# Patient Record
Sex: FEMALE | Race: WHITE
[De-identification: names, ages, dates, MRNs, and addresses within clinical notes are randomized per-mention and may not be internally consistent; named-entity substitution may affect disease eponyms.]

---

## 2020-06-12 ENCOUNTER — HOSPITAL ENCOUNTER (EMERGENCY)
Dept: HOSPITAL 46 - ED | Age: 40
LOS: 1 days | Discharge: HOME | End: 2020-06-13
Payer: COMMERCIAL

## 2020-06-12 VITALS — HEIGHT: 63 IN | WEIGHT: 139 LBS | BODY MASS INDEX: 24.63 KG/M2

## 2020-06-12 DIAGNOSIS — R19.7: Primary | ICD-10-CM

## 2020-06-12 DIAGNOSIS — F17.200: ICD-10-CM

## 2020-06-12 DIAGNOSIS — F32.9: ICD-10-CM

## 2020-06-12 DIAGNOSIS — Z79.899: ICD-10-CM

## 2022-04-07 ENCOUNTER — HOSPITAL ENCOUNTER (EMERGENCY)
Dept: HOSPITAL 46 - ED | Age: 42
LOS: 1 days | Discharge: HOME | End: 2022-04-08
Payer: COMMERCIAL

## 2022-04-07 VITALS — WEIGHT: 137.35 LBS | HEIGHT: 63 IN | BODY MASS INDEX: 24.34 KG/M2

## 2022-04-07 DIAGNOSIS — F32.9: ICD-10-CM

## 2022-04-07 DIAGNOSIS — Z79.891: ICD-10-CM

## 2022-04-07 DIAGNOSIS — N64.4: Primary | ICD-10-CM

## 2022-04-07 DIAGNOSIS — Z79.899: ICD-10-CM

## 2022-04-07 PROCEDURE — A9270 NON-COVERED ITEM OR SERVICE: HCPCS

## 2022-04-07 NOTE — XMS
PreManage Notification: JULIANA LEHMAN MRN:[id number]
 
Security Information
 
Security Events
No recent Security Events currently on file
 
 
 
CRITERIA MET
------------
- ED - Positive COVID-19 Lab Result - [facility]
 
 
CARE PROVIDERS
-------------------------------------------------------------------------------------
WINNIE LUNA     Nurse Practitioner: Family     Current
 
PHONE: Unknown
-------------------------------------------------------------------------------------
Marilyn Aguilar     Nurse Practitioner: Family     Current
 
PHONE: [phone]
-------------------------------------------------------------------------------------
[facility]        04/17/2018-[facility] ([facility]
 
PHONE: [phone]
-------------------------------------------------------------------------------------
Guru Montalvo              /Care Coordinator     Current
[facility] TEAM
 
PHONE: Unknown
-------------------------------------------------------------------------------------
 
Valerie has no Care Guidelines for this patient.
 
BOBBY VISIT COUNT (12 MO.)
-------------------------------------------------------------------------------------
1 MAULIK Akins
-------------------------------------------------------------------------------------
TOTAL 1
-------------------------------------------------------------------------------------
NOTE: Visits indicate total known visits.
 
ED/UCC VISIT TRACKING (12 MO.)
-------------------------------------------------------------------------------------
04/07/2022 23:37
[facility] [place]
 
TYPE: Emergency
 
COMPLAINT:
- LEFT BREAST AND ARMPIT PAIN
-------------------------------------------------------------------------------------
 
 
INPATIENT VISIT TRACKING (12 MO.)
 
No inpatient visits to display in this time frame
 
[email or web address]

## 2022-05-03 ENCOUNTER — HOSPITAL ENCOUNTER (EMERGENCY)
Dept: HOSPITAL 46 - ED | Age: 42
LOS: 1 days | Discharge: HOME | End: 2022-05-04
Payer: COMMERCIAL

## 2022-05-03 VITALS — WEIGHT: 137 LBS | BODY MASS INDEX: 24.27 KG/M2 | HEIGHT: 63 IN

## 2022-05-03 DIAGNOSIS — F32.A: ICD-10-CM

## 2022-05-03 DIAGNOSIS — F17.200: ICD-10-CM

## 2022-05-03 DIAGNOSIS — Z85.3: ICD-10-CM

## 2022-05-03 DIAGNOSIS — Z20.822: ICD-10-CM

## 2022-05-03 DIAGNOSIS — Z91.041: ICD-10-CM

## 2022-05-03 DIAGNOSIS — Z79.891: ICD-10-CM

## 2022-05-03 DIAGNOSIS — T44.6X2A: ICD-10-CM

## 2022-05-03 DIAGNOSIS — Z79.899: ICD-10-CM

## 2022-05-03 DIAGNOSIS — T43.592A: Primary | ICD-10-CM

## 2022-05-03 PROCEDURE — U0003 INFECTIOUS AGENT DETECTION BY NUCLEIC ACID (DNA OR RNA); SEVERE ACUTE RESPIRATORY SYNDROME CORONAVIRUS 2 (SARS-COV-2) (CORONAVIRUS DISEASE [COVID-19]), AMPLIFIED PROBE TECHNIQUE, MAKING USE OF HIGH THROUGHPUT TECHNOLOGIES AS DESCRIBED BY CMS-2020-01-R: HCPCS

## 2022-05-03 PROCEDURE — G0480 DRUG TEST DEF 1-7 CLASSES: HCPCS

## 2022-05-03 PROCEDURE — A9270 NON-COVERED ITEM OR SERVICE: HCPCS

## 2022-05-03 NOTE — XMS
PreManage Notification: JULIANA LEHMAN MRN:[id number]
 
Security Information
 
Security Events
No recent Security Events currently on file
 
 
 
CRITERIA MET
------------
- [facility] - 2 Visits in 30 Days
 
 
CARE PROVIDERS
-------------------------------------------------------------------------------------
WINNIE LUNA     Nurse Practitioner: Family     Current
 
PHONE: Unknown
-------------------------------------------------------------------------------------
Marilyn Aguilar     Nurse Practitioner: Family     Current
 
PHONE: [phone]
-------------------------------------------------------------------------------------
[facility]        04/17/2018-[facility] ([facility] by [facility]
 
PHONE: [phone]
-------------------------------------------------------------------------------------
Guru Montalvo              /Care Coordinator     Current
[facility] TEAM
 
PHONE: Unknown
-------------------------------------------------------------------------------------
 
Valerie has no Care Guidelines for this patient.
 
BOBBY VISIT COUNT (12 MO.)
-------------------------------------------------------------------------------------
2 MAULIK Akins
-------------------------------------------------------------------------------------
TOTAL 2
-------------------------------------------------------------------------------------
NOTE: Visits indicate total known visits.
 
ED/UCC VISIT TRACKING (12 MO.)
-------------------------------------------------------------------------------------
05/03/2022 22:18
[facility] [place]
 
TYPE: Emergency
 
COMPLAINT:
- OVERDOSE
-------------------------------------------------------------------------------------
04/07/2022 23:37
[facility] [place]
 
 
TYPE: Emergency
 
COMPLAINT:
- LEFT BREAST AND ARMPIT PAIN
 
DIAGNOSES:
- Other long term (current) drug therapy
- Major depressive disorder, single episode, unspecified
- Long term (current) use of opiate analgesic
- Mastodynia
-------------------------------------------------------------------------------------
 
 
INPATIENT VISIT TRACKING (12 MO.)
No inpatient visits to display in this time frame
 
[email or web address]

## 2022-05-06 ENCOUNTER — HOSPITAL ENCOUNTER (EMERGENCY)
Dept: HOSPITAL 46 - ED | Age: 42
LOS: 1 days | Discharge: PSYCHIATRIC OR SUBSTANCE USE FACILITY | End: 2022-05-07
Payer: COMMERCIAL

## 2022-05-06 VITALS — WEIGHT: 137.92 LBS | HEIGHT: 63 IN | BODY MASS INDEX: 24.44 KG/M2

## 2022-05-06 DIAGNOSIS — Z20.822: ICD-10-CM

## 2022-05-06 DIAGNOSIS — R45.851: ICD-10-CM

## 2022-05-06 DIAGNOSIS — F17.200: ICD-10-CM
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
## 2022-05-06 DIAGNOSIS — Z79.899: ICD-10-CM

## 2022-05-06 DIAGNOSIS — Z85.3: ICD-10-CM

## 2022-05-06 DIAGNOSIS — S60.221A: Primary | ICD-10-CM

## 2022-05-06 DIAGNOSIS — Z91.041: ICD-10-CM

## 2022-05-06 PROCEDURE — C9803 HOPD COVID-19 SPEC COLLECT: HCPCS

## 2022-05-06 PROCEDURE — G0480 DRUG TEST DEF 1-7 CLASSES: HCPCS

## 2022-05-06 PROCEDURE — A9270 NON-COVERED ITEM OR SERVICE: HCPCS

## 2022-05-06 PROCEDURE — U0003 INFECTIOUS AGENT DETECTION BY NUCLEIC ACID (DNA OR RNA); SEVERE ACUTE RESPIRATORY SYNDROME CORONAVIRUS 2 (SARS-COV-2) (CORONAVIRUS DISEASE [COVID-19]), AMPLIFIED PROBE TECHNIQUE, MAKING USE OF HIGH THROUGHPUT TECHNOLOGIES AS DESCRIBED BY CMS-2020-01-R: HCPCS

## 2022-05-06 NOTE — EKG
[facility]
                                    [address]
                                  [place]  [address]
_________________________________________________________________________________________
                                                                 Signed   
 
 
Normal sinus rhythm
ST \T\ T wave abnormality, consider anterolateral ischemia Prolonged QT Abnormal ECG
When compared with ECG of 15-MAR-2022 08:57,
T wave inversion more evident in Inferior leads
T wave inversion now evident in Anterolateral leads
Confirmed by LEANDRA WILEY MD ([id number]) on 5/6/2022 5:18:43 PM
 
 
 
 
 
 
 
 
 
 
 
 
 
 
 
 
 
 
 
 
 
 
 
 
 
 
 
 
 
 
 
 
 
 
 
 
 
 
 
    Electronically Signed By: LEANDRA WILEY MD  05/06/22 1718
_________________________________________________________________________________________
PATIENT NAME:     JULIANA LEHMAN              
MEDICAL RECORD #: [id number]                     Electrocardiogram             
          ACCT #: [id number]  
DATE OF BIRTH:   [birth year]                                       
PHYSICIAN:   LEANDRA WILEY MD           REPORT #: [id number]
REPORT IS CONFIDENTIAL AND NOT TO BE RELEASED WITHOUT AUTHORIZATION

## 2022-05-06 NOTE — XMS
PreManage Notification: JULIANA LEHMAN MRN:[id number]
 
Security Information
 
Security Events
No recent Security Events currently on file
 
 
 
CRITERIA MET
------------
- [facility] - 2 Visits in 30 Days
 
 
CARE PROVIDERS
-------------------------------------------------------------------------------------
ALINE BELLO      Physician Assistant     05/05/2022-Current
 
PHONE: [phone]
-------------------------------------------------------------------------------------
WINNIE LUNA     Nurse Practitioner: Family     Current
 
PHONE: Unknown
-------------------------------------------------------------------------------------
Marilyn Aguilar     Nurse Practitioner: Family     Current
 
PHONE: [phone]
-------------------------------------------------------------------------------------
[facility]        04/17/2018-[facility] ([facility])
 
PHONE: [phone]
-------------------------------------------------------------------------------------
Guru Montalvo              /Care Coordinator     [facility]
 
PHONE: Unknown
-------------------------------------------------------------------------------------
 
Valerie has no Care Guidelines for this patient.
 
EBryannaDBryanna VISIT COUNT (12 MO.)
-------------------------------------------------------------------------------------
3 MAULIK Akins
-------------------------------------------------------------------------------------
TOTAL 3
-------------------------------------------------------------------------------------
NOTE: Visits indicate total known visits.
 
ED/UCC VISIT TRACKING (12 MO.)
-------------------------------------------------------------------------------------
05/06/2022 15:20
[facility] [place]
 
TYPE: Emergency
 
COMPLAINT:
- SUICIDAL IDEATIONS
 
-------------------------------------------------------------------------------------
05/03/2022 22:18
[facility] [place]
 
TYPE: Emergency
 
COMPLAINT:
- OVERDOSE
 
DIAGNOSES:
- Radiographic dye allergy status
- Poisoning by other antipsychotics and neuroleptics, accidental (unintentional),
initial encounter
- Nicotine dependence, unspecified, uncomplicated
- Depression, unspecified
- Long term (current) use of opiate analgesic
- Other long term (current) drug therapy
- Poisoning by other antipsychotics and neuroleptics, intentional self-harm, initial
encounter
- Personal history of malignant neoplasm of breast
- Poisoning by alpha-adrenoreceptor antagonists, intentional self-harm, initial
encounter
- Contact with and (suspected) exposure to COVID-19
-------------------------------------------------------------------------------------
04/07/2022 23:37
[facility] [place]
 
TYPE: Emergency
 
COMPLAINT:
- LEFT BREAST AND ARMPIT PAIN
 
DIAGNOSES:
- Other long term (current) drug therapy
- Major depressive disorder, single episode, unspecified
- Long term (current) use of opiate analgesic
- Mastodynia
-------------------------------------------------------------------------------------
 
 
INPATIENT VISIT TRACKING (12 MO.)
No inpatient visits to display in this time frame
 
[email or web address]

## 2022-05-06 NOTE — EKG
[facility]
                                    [address]
                                  [place]  [address]
_________________________________________________________________________________________
                                                                 Signed   
 
 
Normal sinus rhythm with sinus arrhythmia
Nonspecific T wave abnormality
Prolonged QT
Abnormal ECG
When compared with ECG of 03-MAY-2022 22:25, (Unconfirmed)
Vent. rate has decreased BY  34 BPM
Nonspecific T wave abnormality has replaced inverted T waves in Inferior leads
T wave inversion less evident in Anterolateral leads
Confirmed by LEANDRA WILEY MD ([id number]) on 5/6/2022 5:18:50 PM
 
 
 
 
 
 
 
 
 
 
 
 
 
 
 
 
 
 
 
 
 
 
 
 
 
 
 
 
 
 
 
 
 
 
 
 
    Electronically Signed By: LEANDRA WILEY MD  05/06/22 1719
_________________________________________________________________________________________
PATIENT NAME:     JULIANA LEHMAN              
MEDICAL RECORD #: [id number]                     Electrocardiogram             
          ACCT #: [id number]  
DATE OF BIRTH:   [birth year]                                       
PHYSICIAN:   LEANDRA WILEY MD           REPORT #: [id number]
REPORT IS CONFIDENTIAL AND NOT TO BE RELEASED WITHOUT AUTHORIZATION

## 2022-05-09 NOTE — EKG
[facility]
                                    [address]
                                  [place], [place]  [address]
_________________________________________________________________________________________
                                                                 Signed   
 
 
Sinus bradycardia with sinus arrhythmia
Otherwise normal ECG
No previous ECGs available
Confirmed by LEANDRA WILEY MD ([id number]) on 5/9/2022 2:06:42 PM
 
 
 
 
 
 
 
 
 
 
 
 
 
 
 
 
 
 
 
 
 
 
 
 
 
 
 
 
 
 
 
 
 
 
 
 
 
 
 
 
 
    Electronically Signed By: LEANDRA WILEY MD  05/09/22 1407
_________________________________________________________________________________________
PATIENT NAME:     OLGA MEHTAISTNUNUJULIANA VIBHA              
MEDICAL RECORD #: [id number]                     Electrocardiogram             
          ACCT #: [id number]  
DATE OF BIRTH:   [birth year]                                       
PHYSICIAN:   LEANDRA WILEY MD           REPORT #: [id number]
REPORT IS CONFIDENTIAL AND NOT TO BE RELEASED WITHOUT AUTHORIZATION

## 2022-05-13 ENCOUNTER — HOSPITAL ENCOUNTER (EMERGENCY)
Dept: HOSPITAL 46 - ED | Age: 42
Discharge: HOME | End: 2022-05-13
Payer: COMMERCIAL

## 2022-05-13 VITALS — WEIGHT: 136 LBS | HEIGHT: 63 IN | BODY MASS INDEX: 24.1 KG/M2

## 2022-05-13 DIAGNOSIS — Z91.041: ICD-10-CM

## 2022-05-13 DIAGNOSIS — Z79.899: ICD-10-CM

## 2022-05-13 DIAGNOSIS — S50.12XA: ICD-10-CM

## 2022-05-13 DIAGNOSIS — T81.49XA: Primary | ICD-10-CM

## 2022-05-13 DIAGNOSIS — Z85.3: ICD-10-CM
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
## 2022-05-13 DIAGNOSIS — N61.0: ICD-10-CM

## 2022-05-13 DIAGNOSIS — F17.200: ICD-10-CM

## 2022-05-13 PROCEDURE — A9270 NON-COVERED ITEM OR SERVICE: HCPCS

## 2022-05-13 NOTE — XMS
PreManage Notification: JULIANA LEHMAN MRN:[id number]
 
Security Information
 
Security Events
No recent Security Events currently on file
 
 
 
CRITERIA MET
------------
- [facility] - 2 Visits in 30 Days
 
 
CARE PROVIDERS
-------------------------------------------------------------------------------------
ALINE BELLO      Physician Assistant     05/05/2022-Current
 
PHONE: [phone]
-------------------------------------------------------------------------------------
WINNIE LUNA     Nurse Practitioner: Family     Current
 
PHONE: Unknown
-------------------------------------------------------------------------------------
Marilyn Aguilar     Nurse Practitioner: Family     Current
 
PHONE: [phone]
-------------------------------------------------------------------------------------
[facility]        04/17/2018-[facility]
 
PHONE: [phone]
-------------------------------------------------------------------------------------
Guru Montalvo              /Care Coordinator     [facility]
 
PHONE: Unknown
-------------------------------------------------------------------------------------
 
Valerie has no Care Guidelines for this patient.
 
EBryannaDBryanna VISIT COUNT (12 MO.)
-------------------------------------------------------------------------------------
Peter Akins
-------------------------------------------------------------------------------------
TOTAL 4
-------------------------------------------------------------------------------------
NOTE: Visits indicate total known visits.
 
ED/UCC VISIT TRACKING (12 MO.)
-------------------------------------------------------------------------------------
05/13/2022 18:08
[facility] [place]
 
TYPE: Emergency
 
COMPLAINT:
- ARM INJURY, POSS CHEST INFECTION
 
-------------------------------------------------------------------------------------
05/06/2022 15:20
[facility] [place]
 
TYPE: Emergency
 
COMPLAINT:
- HAND PN, SUICIDAL IDEATIONS
 
DIAGNOSES:
- Contusion of right hand, initial encounter
- Contact with and (suspected) exposure to COVID-19
- Suicidal ideations
- Other long term (current) drug therapy
- Radiographic dye allergy status
- Nicotine dependence, unspecified, uncomplicated
- Striking against or struck by other objects, initial encounter
- Suicidal ideations
- Personal history of malignant neoplasm of breast
- Contusion of right hand, initial encounter
-------------------------------------------------------------------------------------
05/03/2022 22:18
[facility] [place]
 
TYPE: Emergency
 
COMPLAINT:
- OVERDOSE
 
DIAGNOSES:
- Radiographic dye allergy status
- Poisoning by other antipsychotics and neuroleptics, accidental (unintentional),
initial encounter
- Nicotine dependence, unspecified, uncomplicated
- Depression, unspecified
- Long term (current) use of opiate analgesic
- Other long term (current) drug therapy
- Poisoning by other antipsychotics and neuroleptics, intentional self-harm, initial
encounter
- Personal history of malignant neoplasm of breast
- Poisoning by alpha-adrenoreceptor antagonists, intentional self-harm, initial
encounter
- Contact with and (suspected) exposure to COVID-19
-------------------------------------------------------------------------------------
04/07/2022 23:37
[facility] [place]
 
TYPE: Emergency
 
COMPLAINT:
- LEFT BREAST AND ARMPIT PAIN
 
DIAGNOSES:
- Other long term (current) drug therapy
- Major depressive disorder, single episode, unspecified
- Long term (current) use of opiate analgesic
- Mastodynia
-------------------------------------------------------------------------------------
 
 
 
INPATIENT VISIT TRACKING (12 MO.)
No inpatient visits to display in this time frame
 
[email or web address]

## 2022-06-04 ENCOUNTER — HOSPITAL ENCOUNTER (EMERGENCY)
Dept: HOSPITAL 46 - ED | Age: 42
Discharge: HOME | End: 2022-06-04
Payer: COMMERCIAL

## 2022-06-04 VITALS — WEIGHT: 136.44 LBS | HEIGHT: 63 IN | BODY MASS INDEX: 24.18 KG/M2

## 2022-06-04 DIAGNOSIS — F17.200: ICD-10-CM

## 2022-06-04 DIAGNOSIS — Z79.899: ICD-10-CM

## 2022-06-04 DIAGNOSIS — R45.851: Primary | ICD-10-CM

## 2022-06-04 DIAGNOSIS — Z85.3: ICD-10-CM

## 2022-06-04 DIAGNOSIS — F43.10: ICD-10-CM

## 2022-06-04 DIAGNOSIS — Z91.041: ICD-10-CM

## 2022-06-04 PROCEDURE — G0480 DRUG TEST DEF 1-7 CLASSES: HCPCS

## 2022-06-04 NOTE — XMS
PreManage Notification: JULIANA LEHMAN MRN:[id number]
 
Security Information
 
Security Events
No recent Security Events currently on file
 
 
 
CRITERIA MET
------------
- [facility] - 2 Visits in 30 Days
 
 
CARE PROVIDERS
-------------------------------------------------------------------------------------
ALINE BELLO      Physician Assistant     05/05/2022-Current
 
PHONE: [phone]
-------------------------------------------------------------------------------------
WINNIE LUNA     Nurse Practitioner: Family     Current
 
PHONE: Unknown
-------------------------------------------------------------------------------------
Marilyn Aguilar     Nurse Practitioner: Family     Current
 
PHONE: [phone]
-------------------------------------------------------------------------------------
[facility]        04/17/2018-[facility]
 
PHONE: [phone]
-------------------------------------------------------------------------------------
Guru Montalvo              /Care Coordinator     [facility]
 
PHONE: Unknown
-------------------------------------------------------------------------------------
 
Valerie has no Care Guidelines for this patient.
 
E.DBryanna VISIT COUNT (12 MO.)
-------------------------------------------------------------------------------------
Kayleigh Akins
-------------------------------------------------------------------------------------
TOTAL 5
-------------------------------------------------------------------------------------
NOTE: Visits indicate total known visits.
 
ED/UCC VISIT TRACKING (12 MO.)
-------------------------------------------------------------------------------------
06/04/2022 13:38
[facility] [place]
 
TYPE: Emergency
 
COMPLAINT:
- MEDICAL CLEARANCE
 
-------------------------------------------------------------------------------------
05/13/2022 18:08
[facility] [place]
 
TYPE: Emergency
 
COMPLAINT:
- ARM INJURY, POSS CHEST INFECTION
 
DIAGNOSES:
- Mastitis without abscess
- Other specified disorders of the skin and subcutaneous tissue
- Nicotine dependence, unspecified, uncomplicated
- Other long term (current) drug therapy
- Radiographic dye allergy status
- Infection following a procedure, other surgical site, initial encounter
- Contusion of left forearm, initial encounter
- Personal history of malignant neoplasm of breast
- Fall on same level from slipping, tripping and stumbling without subsequent
striking against object, initial encounter
-------------------------------------------------------------------------------------
05/06/2022 15:20
[facility] [place]
 
TYPE: Emergency
 
COMPLAINT:
- HAND PN, SUICIDAL IDEATIONS
 
DIAGNOSES:
- Contusion of right hand, initial encounter
- Contact with and (suspected) exposure to COVID-19
- Suicidal ideations
- Other long term (current) drug therapy
- Radiographic dye allergy status
- Nicotine dependence, unspecified, uncomplicated
- Striking against or struck by other objects, initial encounter
- Suicidal ideations
- Personal history of malignant neoplasm of breast
- Contusion of right hand, initial encounter
-------------------------------------------------------------------------------------
05/03/2022 22:18
[facility] [place]
 
TYPE: Emergency
 
COMPLAINT:
- OVERDOSE
 
DIAGNOSES:
- Radiographic dye allergy status
- Poisoning by other antipsychotics and neuroleptics, accidental (unintentional),
initial encounter
- Nicotine dependence, unspecified, uncomplicated
- Depression, unspecified
- Long term (current) use of opiate analgesic
- Other long term (current) drug therapy
- Poisoning by other antipsychotics and neuroleptics, intentional self-harm, initial
encounter
- Personal history of malignant neoplasm of breast
 
- Poisoning by alpha-adrenoreceptor antagonists, intentional self-harm, initial
encounter
- Contact with and (suspected) exposure to COVID-19
-------------------------------------------------------------------------------------
04/07/2022 23:37
[facility] [place]
 
TYPE: Emergency
 
COMPLAINT:
- LEFT BREAST AND ARMPIT PAIN
 
DIAGNOSES:
- Other long term (current) drug therapy
- Major depressive disorder, single episode, unspecified
- Long term (current) use of opiate analgesic
- Mastodynia
-------------------------------------------------------------------------------------
 
 
INPATIENT VISIT TRACKING (12 MO.)
No inpatient visits to display in this time frame
 
[email or web address]

## 2022-06-27 ENCOUNTER — HOSPITAL ENCOUNTER (EMERGENCY)
Dept: HOSPITAL 46 - ED | Age: 42
LOS: 1 days | Discharge: PSYCHIATRIC OR SUBSTANCE USE FACILITY | End: 2022-06-28
Payer: COMMERCIAL

## 2022-06-27 VITALS — WEIGHT: 136.44 LBS | HEIGHT: 63 IN | BODY MASS INDEX: 24.18 KG/M2

## 2022-06-27 DIAGNOSIS — Z91.041: ICD-10-CM

## 2022-06-27 DIAGNOSIS — T43.592A: Primary | ICD-10-CM

## 2022-06-27 DIAGNOSIS — Z20.822: ICD-10-CM

## 2022-06-27 DIAGNOSIS — F17.200: ICD-10-CM

## 2022-06-27 DIAGNOSIS — Z79.899: ICD-10-CM

## 2022-06-27 PROCEDURE — G0480 DRUG TEST DEF 1-7 CLASSES: HCPCS

## 2022-06-27 PROCEDURE — U0003 INFECTIOUS AGENT DETECTION BY NUCLEIC ACID (DNA OR RNA); SEVERE ACUTE RESPIRATORY SYNDROME CORONAVIRUS 2 (SARS-COV-2) (CORONAVIRUS DISEASE [COVID-19]), AMPLIFIED PROBE TECHNIQUE, MAKING USE OF HIGH THROUGHPUT TECHNOLOGIES AS DESCRIBED BY CMS-2020-01-R: HCPCS

## 2022-06-27 NOTE — EKG
[facility]
                                    [address] [place]  [address]
_________________________________________________________________________________________
                                                                 Signed   
 
 
Sinus bradycardia
Abnormal ECG
When compared with ECG of 27-JUN-2022 00:08, (Unconfirmed)
Vent. rate has decreased BY  55 BPM
ST no longer depressed in Inferior leads
T wave inversion no longer evident in Inferior leads
T wave inversion no longer evident in Lateral leads
QT has shortened
Confirmed by LEANDRA WILEY MD ([id number]) on 6/27/2022 2:04:42 PM
 
 
 
 
 
 
 
 
 
 
 
 
 
 
 
 
 
 
 
 
 
 
 
 
 
 
 
 
 
 
 
 
 
 
 
 
    Electronically Signed By: LEANDRA WILEY MD  06/27/22 1404
_________________________________________________________________________________________
PATIENT NAME:     JULIANA LEHMAN              
MEDICAL RECORD #: [id number]                     Electrocardiogram             
          ACCT #: [id number]  
DATE OF BIRTH:   [birth year]                                       
PHYSICIAN:   LEANDRA WILEY MD           REPORT #: [id number]
REPORT IS CONFIDENTIAL AND NOT TO BE RELEASED WITHOUT AUTHORIZATION

## 2022-06-27 NOTE — XMS
PreManage Notification: JULIANA LEHMAN MRN:[id number]
 
Security Information
 
Security Events
No recent Security Events currently on file
 
 
 
CRITERIA MET
------------
- 6 ED Visits in 6 Months
- [facility] - 2 Visits in 30 Days
 
 
CARE PROVIDERS
-------------------------------------------------------------------------------------
ALINE BELLO      Physician Assistant     05/05/2022-Current
 
PHONE: [phone]
-------------------------------------------------------------------------------------
WINNIE LUNA     Nurse Practitioner: Family     Current
 
PHONE: Unknown
-------------------------------------------------------------------------------------
Marilyn Aguilar     Nurse Practitioner: Family     Current
 
PHONE: [phone]
-------------------------------------------------------------------------------------
[facility]        04/17/[facility] ([facility]
 
PHONE: [phone]
-------------------------------------------------------------------------------------
Guru Montalvo              /Care Coordinator     [facility]
 
PHONE: Unknown
-------------------------------------------------------------------------------------
 
Valerie has no Care Guidelines for this patient.
 
EBryannaDBryanna VISIT COUNT (12 MO.)
-------------------------------------------------------------------------------------
Chelle Akins
-------------------------------------------------------------------------------------
TOTAL 6
-------------------------------------------------------------------------------------
NOTE: Visits indicate total known visits.
 
ED/UCC VISIT TRACKING (12 MO.)
-------------------------------------------------------------------------------------
06/27/2022 00:03
[facility] [place]
 
TYPE: Emergency
 
COMPLAINT:
 
- OD
-------------------------------------------------------------------------------------
06/04/2022 13:38
[facility] [place]
 
TYPE: Emergency
 
COMPLAINT:
- MEDICAL CLEARANCE
 
DIAGNOSES:
- Personal history of malignant neoplasm of breast
- Suicidal ideations
- Post-traumatic stress disorder, unspecified
- Radiographic dye allergy status
- Nicotine dependence, unspecified, uncomplicated
- Other long term (current) drug therapy
-------------------------------------------------------------------------------------
05/13/2022 18:08
[facility] [place]
 
TYPE: Emergency
 
COMPLAINT:
- ARM INJURY, POSS CHEST INFECTION
 
DIAGNOSES:
- Mastitis without abscess
- Other specified disorders of the skin and subcutaneous tissue
- Nicotine dependence, unspecified, uncomplicated
- Other long term (current) drug therapy
- Radiographic dye allergy status
- Infection following a procedure, other surgical site, initial encounter
- Contusion of left forearm, initial encounter
- Personal history of malignant neoplasm of breast
- Fall on same level from slipping, tripping and stumbling without subsequent
striking against object, initial encounter
-------------------------------------------------------------------------------------
05/06/2022 15:20
[facility] [place]
 
TYPE: Emergency
 
COMPLAINT:
- HAND PN, SUICIDAL IDEATIONS
 
DIAGNOSES:
- Contusion of right hand, initial encounter
- Contact with and (suspected) exposure to COVID-19
- Suicidal ideations
- Other long term (current) drug therapy
- Radiographic dye allergy status
- Nicotine dependence, unspecified, uncomplicated
- Striking against or struck by other objects, initial encounter
- Suicidal ideations
- Personal history of malignant neoplasm of breast
- Contusion of right hand, initial encounter
-------------------------------------------------------------------------------------
05/03/2022 22:18
[facility] [place]
 
 
TYPE: Emergency
 
COMPLAINT:
- OVERDOSE
 
DIAGNOSES:
- Radiographic dye allergy status
- Poisoning by other antipsychotics and neuroleptics, accidental (unintentional),
initial encounter
- Nicotine dependence, unspecified, uncomplicated
- Depression, unspecified
- Long term (current) use of opiate analgesic
- Other long term (current) drug therapy
- Poisoning by other antipsychotics and neuroleptics, intentional self-harm, initial
encounter
- Personal history of malignant neoplasm of breast
- Poisoning by alpha-adrenoreceptor antagonists, intentional self-harm, initial
encounter
- Contact with and (suspected) exposure to COVID-19
-------------------------------------------------------------------------------------
04/07/2022 23:37
[facility] [place]
 
TYPE: Emergency
 
COMPLAINT:
- LEFT BREAST AND ARMPIT PAIN
 
DIAGNOSES:
- Other long term (current) drug therapy
- Major depressive disorder, single episode, unspecified
- Long term (current) use of opiate analgesic
- Mastodynia
-------------------------------------------------------------------------------------
 
 
INPATIENT VISIT TRACKING (12 MO.)
No inpatient visits to display in this time frame
 
[email or web address]

## 2022-06-27 NOTE — EKG
[facility]
                                    [address]
                                  [place]  [address]
_________________________________________________________________________________________
                                                                 Signed   
 
 
Sinus tachycardia
ST \T\ T wave abnormality, consider inferior ischemia ST \T\ T wave abnormality,
consider anterolateral ischemia Prolonged QT Abnormal ECG When compared with ECG of
06-MAY-2022 20:28,
Significant changes have occurred
Confirmed by LEANDRA WILEY MD ([id number]) on 6/27/2022 2:04:01 PM
 
 
 
 
 
 
 
 
 
 
 
 
 
 
 
 
 
 
 
 
 
 
 
 
 
 
 
 
 
 
 
 
 
 
 
 
 
 
 
    Electronically Signed By: LEANDRA WILEY MD  06/27/22 1404
_________________________________________________________________________________________
PATIENT NAME:     JULIANA LEHMAN              
MEDICAL RECORD #: [id number]                     Electrocardiogram             
          ACCT #: [id number]  
DATE OF BIRTH:   [birth year]                                       
PHYSICIAN:   LEANDRA WILEY MD           REPORT #: [id number]
REPORT IS CONFIDENTIAL AND NOT TO BE RELEASED WITHOUT AUTHORIZATION

## 2022-10-18 ENCOUNTER — HOSPITAL ENCOUNTER (EMERGENCY)
Dept: HOSPITAL 46 - ED | Age: 42
Discharge: HOME | End: 2022-10-18
Payer: COMMERCIAL

## 2022-10-18 VITALS — HEIGHT: 63 IN | BODY MASS INDEX: 22.27 KG/M2 | WEIGHT: 125.66 LBS

## 2022-10-18 DIAGNOSIS — F17.200: ICD-10-CM

## 2022-10-18 DIAGNOSIS — S92.311A: Primary | ICD-10-CM

## 2022-10-18 DIAGNOSIS — Z91.041: ICD-10-CM
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
## 2022-10-18 PROCEDURE — A9270 NON-COVERED ITEM OR SERVICE: HCPCS

## 2022-10-18 NOTE — XMS
PreManage Notification: JULIANA LEHMAN MRN:[id number]
 
Security Information
 
Security Events
No recent Security Events currently on file
 
 
 
CRITERIA MET
------------
- 6 ED Visits in 6 Months
 
 
CARE PROVIDERS
-------------------------------------------------------------------------------------
WINNIE LUNA     Nurse Practitioner: Family     Current
 
PHONE: Unknown
-------------------------------------------------------------------------------------
Marilyn Aguilar-ISAÍAS     Nurse Practitioner: Family     Current
 
PHONE: [phone]
-------------------------------------------------------------------------------------
Guru Montalvo              /Care Coordinator     [facility] TE[facility]
 
PHONE: Unknown
-------------------------------------------------------------------------------------
 
Valerie has no Care Guidelines for this patient.
 
E.D. VISIT COUNT (12 MO.)
-------------------------------------------------------------------------------------
7 [facility] A[facility]
-------------------------------------------------------------------------------------
TOTAL 7
-------------------------------------------------------------------------------------
NOTE: Visits indicate total known visits.
 
ED/UCC VISIT TRACKING (12 MO.)
-------------------------------------------------------------------------------------
10/18/2022 16:56
[facility] [place]
 
TYPE: Emergency
 
COMPLAINT:
- RT WRIST INJURY
-------------------------------------------------------------------------------------
06/27/2022 00:03
[facility] [place]
 
TYPE: Emergency
 
COMPLAINT:
- OD
 
 
DIAGNOSES:
- Nicotine dependence, unspecified, uncomplicated
- Poisoning by other antipsychotics and neuroleptics, intentional self-harm, initial
encounter
- Other long term (current) drug therapy
- Radiographic dye allergy status
- Contact with and (suspected) exposure to COVID-19
-------------------------------------------------------------------------------------
06/04/2022 13:38
[facility] [place]
 
TYPE: Emergency
 
COMPLAINT:
- MEDICAL CLEARANCE
 
DIAGNOSES:
- Nicotine dependence, unspecified, uncomplicated
- Post-traumatic stress disorder, unspecified
- Personal history of malignant neoplasm of breast
- Other long term (current) drug therapy
- Radiographic dye allergy status
- Suicidal ideations
-------------------------------------------------------------------------------------
05/13/2022 18:08
[facility] [place]
 
TYPE: Emergency
 
COMPLAINT:
- ARM INJURY, POSS CHEST INFECTION
 
DIAGNOSES:
- Radiographic dye allergy status
- Nicotine dependence, unspecified, uncomplicated
- Mastitis without abscess
- Personal history of malignant neoplasm of breast
- Infection following a procedure, other surgical site, initial encounter
- Other long term (current) drug therapy
- Other specified disorders of the skin and subcutaneous tissue
- Fall on same level from slipping, tripping and stumbling without subsequent
striking against object, initial encounter
- Contusion of left forearm, initial encounter
-------------------------------------------------------------------------------------
05/06/2022 15:20
[facility] [place]
 
TYPE: Emergency
 
COMPLAINT:
- HAND PN, SUICIDAL IDEATIONS
 
DIAGNOSES:
- Radiographic dye allergy status
- Suicidal ideations
- Contusion of right hand, initial encounter
- Contusion of right hand, initial encounter
- Suicidal ideations
- Nicotine dependence, unspecified, uncomplicated
- Other long term (current) drug therapy
 
- Contact with and (suspected) exposure to COVID-19
- Personal history of malignant neoplasm of breast
- Striking against or struck by other objects, initial encounter
-------------------------------------------------------------------------------------
05/03/2022 22:18
[facility] [place]
 
TYPE: Emergency
 
COMPLAINT:
- OVERDOSE
 
DIAGNOSES:
- Long term (current) use of opiate analgesic
- Nicotine dependence, unspecified, uncomplicated
- Contact with and (suspected) exposure to COVID-19
- Radiographic dye allergy status
- Personal history of malignant neoplasm of breast
- Other long term (current) drug therapy
- Depression, unspecified
- Poisoning by other antipsychotics and neuroleptics, accidental (unintentional),
initial encounter
- Poisoning by alpha-adrenoreceptor antagonists, intentional self-harm, initial
encounter
- Poisoning by other antipsychotics and neuroleptics, intentional self-harm, initial
encounter
-------------------------------------------------------------------------------------
04/07/2022 23:37
[facility] [place]
 
TYPE: Emergency
 
COMPLAINT:
- LEFT BREAST AND ARMPIT PAIN
 
DIAGNOSES:
- Long term (current) use of opiate analgesic
- Other long term (current) drug therapy
- Mastodynia
- Major depressive disorder, single episode, unspecified
-------------------------------------------------------------------------------------
 
 
INPATIENT VISIT TRACKING (12 MO.)
-------------------------------------------------------------------------------------
06/28/2022 23:43
[facility]
 
TYPE: [facility]al [facility]
 
DIAGNOSES:
- Unspecified psychosis not due to a substance or known physiological condition
- Major depressive disorder, recurrent severe without psychotic features
-------------------------------------------------------------------------------------
 
[email or web address]

## 2023-04-28 ENCOUNTER — HOSPITAL ENCOUNTER (EMERGENCY)
Dept: HOSPITAL 46 - ED | Age: 43
LOS: 1 days | Discharge: HOME | End: 2023-04-29
Payer: COMMERCIAL

## 2023-04-28 VITALS — HEIGHT: 63 IN | BODY MASS INDEX: 22.5 KG/M2 | WEIGHT: 127.01 LBS

## 2023-04-28 DIAGNOSIS — Z79.899: ICD-10-CM

## 2023-04-28 DIAGNOSIS — F17.200: ICD-10-CM

## 2023-04-28 DIAGNOSIS — Z91.041: ICD-10-CM

## 2023-04-28 DIAGNOSIS — R45.851: Primary | ICD-10-CM

## 2023-04-28 PROCEDURE — G0480 DRUG TEST DEF 1-7 CLASSES: HCPCS

## 2023-04-29 VITALS — SYSTOLIC BLOOD PRESSURE: 104 MMHG | DIASTOLIC BLOOD PRESSURE: 51 MMHG

## 2023-04-30 NOTE — EKG
[facility]
                                    [address]
                                  [place]  [address]
_________________________________________________________________________________________
                                                                 Signed   
 
 
Normal sinus rhythm
ST \T\ T wave abnormality, consider anterolateral ischemia Abnormal ECG When compared
with ECG of 27-JUN-2022 10:27,
Vent. rate has increased BY  30 BPM
Confirmed by LEANDRA WILEY MD ([id number]) on 4/30/2023 4:42:28 PM
 
 
 
 
 
 
 
 
 
 
 
 
 
 
 
 
 
 
 
 
 
 
 
 
 
 
 
 
 
 
 
 
 
 
 
 
 
 
 
 
    Electronically Signed By: LEANDRA WILEY MD  04/30/23 1642
_________________________________________________________________________________________
PATIENT NAME:     JULIANA LEHMAN              
MEDICAL RECORD #: [id number]                     Electrocardiogram             
          ACCT #: [id number]  
DATE OF BIRTH:   [birth year]                                       
PHYSICIAN:   LEANDRA WILEY MD                    REPORT #: [id number]
REPORT IS CONFIDENTIAL AND NOT TO BE RELEASED WITHOUT AUTHORIZATION